# Patient Record
Sex: MALE | Race: WHITE | NOT HISPANIC OR LATINO | Employment: OTHER | URBAN - METROPOLITAN AREA
[De-identification: names, ages, dates, MRNs, and addresses within clinical notes are randomized per-mention and may not be internally consistent; named-entity substitution may affect disease eponyms.]

---

## 2018-10-03 ENCOUNTER — NEW PATIENT EMERGENCY (OUTPATIENT)
Dept: URBAN - METROPOLITAN AREA CLINIC 39 | Facility: CLINIC | Age: 58
End: 2018-10-03

## 2018-10-03 VITALS — HEART RATE: 63 BPM | DIASTOLIC BLOOD PRESSURE: 67 MMHG | SYSTOLIC BLOOD PRESSURE: 159 MMHG | HEIGHT: 60 IN

## 2018-10-03 DIAGNOSIS — H40.023: ICD-10-CM

## 2018-10-03 DIAGNOSIS — H25.813: ICD-10-CM

## 2018-10-03 DIAGNOSIS — H04.123: ICD-10-CM

## 2018-10-03 DIAGNOSIS — H40.043: ICD-10-CM

## 2018-10-03 PROCEDURE — 92020 GONIOSCOPY: CPT

## 2018-10-03 PROCEDURE — 92004 COMPRE OPH EXAM NEW PT 1/>: CPT

## 2018-10-03 PROCEDURE — 76514 ECHO EXAM OF EYE THICKNESS: CPT

## 2018-10-03 PROCEDURE — 92132 CPTRZD OPH DX IMG ANT SGM: CPT

## 2018-10-03 ASSESSMENT — VISUAL ACUITY
OS_SC: 20/25-2
OS_SC: J6
OD_SC: J10
OD_SC: 20/30-1

## 2018-10-03 ASSESSMENT — TONOMETRY
OD_IOP_MMHG: 27
OS_IOP_MMHG: 28

## 2018-10-03 ASSESSMENT — PACHYMETRY
OS_CT_UM: 598
OD_CT_UM: 628

## 2018-10-12 ENCOUNTER — IOP CHECK (OUTPATIENT)
Dept: URBAN - METROPOLITAN AREA CLINIC 36 | Facility: CLINIC | Age: 58
End: 2018-10-12

## 2018-10-12 DIAGNOSIS — H40.053: ICD-10-CM

## 2018-10-12 DIAGNOSIS — H40.043: ICD-10-CM

## 2018-10-12 DIAGNOSIS — H40.023: ICD-10-CM

## 2018-10-12 PROCEDURE — 92012 INTRM OPH EXAM EST PATIENT: CPT

## 2018-10-12 RX ORDER — BRIMONIDINE TARTRATE 1.5 MG/ML: 1 SOLUTION/ DROPS OPHTHALMIC TWICE A DAY

## 2018-10-12 ASSESSMENT — VISUAL ACUITY
OD_SC: 20/30-2
OS_SC: 20/25+2

## 2018-10-12 ASSESSMENT — TONOMETRY
OD_IOP_MMHG: 16
OS_IOP_MMHG: 18

## 2019-02-01 ENCOUNTER — IOP CHECK (OUTPATIENT)
Dept: URBAN - METROPOLITAN AREA CLINIC 36 | Facility: CLINIC | Age: 59
End: 2019-02-01

## 2019-02-01 DIAGNOSIS — H40.043: ICD-10-CM

## 2019-02-01 DIAGNOSIS — H40.053: ICD-10-CM

## 2019-02-01 DIAGNOSIS — H40.023: ICD-10-CM

## 2019-02-01 PROCEDURE — 92012 INTRM OPH EXAM EST PATIENT: CPT

## 2019-02-01 PROCEDURE — 92133 CPTRZD OPH DX IMG PST SGM ON: CPT

## 2019-02-01 RX ORDER — BRIMONIDINE TARTRATE 2 MG/MG: 1 SOLUTION/ DROPS OPHTHALMIC TWICE A DAY

## 2019-02-01 ASSESSMENT — TONOMETRY
OS_IOP_MMHG: 16
OD_IOP_MMHG: 16

## 2019-02-01 ASSESSMENT — VISUAL ACUITY
OS_SC: 20/25-1
OD_SC: 20/30-2

## 2019-07-26 ENCOUNTER — IOP CHECK (OUTPATIENT)
Dept: URBAN - METROPOLITAN AREA CLINIC 36 | Facility: CLINIC | Age: 59
End: 2019-07-26

## 2019-07-26 DIAGNOSIS — H40.043: ICD-10-CM

## 2019-07-26 DIAGNOSIS — H04.123: ICD-10-CM

## 2019-07-26 DIAGNOSIS — H40.023: ICD-10-CM

## 2019-07-26 DIAGNOSIS — H40.053: ICD-10-CM

## 2019-07-26 PROCEDURE — 92012 INTRM OPH EXAM EST PATIENT: CPT

## 2019-07-26 PROCEDURE — 92083 EXTENDED VISUAL FIELD XM: CPT

## 2019-07-26 ASSESSMENT — VISUAL ACUITY
OD_SC: 20/30+2
OS_SC: 20/30+2

## 2019-07-26 ASSESSMENT — TONOMETRY
OD_IOP_MMHG: 14
OS_IOP_MMHG: 15

## 2019-12-17 ENCOUNTER — ESTABLISHED COMPREHENSIVE EXAM (OUTPATIENT)
Dept: URBAN - METROPOLITAN AREA CLINIC 43 | Facility: CLINIC | Age: 59
End: 2019-12-17

## 2019-12-17 DIAGNOSIS — H40.023: ICD-10-CM

## 2019-12-17 DIAGNOSIS — H40.043: ICD-10-CM

## 2019-12-17 DIAGNOSIS — H40.053: ICD-10-CM

## 2019-12-17 PROCEDURE — 92014 COMPRE OPH EXAM EST PT 1/>: CPT

## 2019-12-17 PROCEDURE — 92250 FUNDUS PHOTOGRAPHY W/I&R: CPT

## 2019-12-17 PROCEDURE — 9222550 BILAT EXTENDED OPHTHALMOSCOPY, FIRST

## 2019-12-17 ASSESSMENT — VISUAL ACUITY
OD_SC: J6
OD_CC: 20/30+2
OD_CC: J1
OS_CC: 20/25-1
OS_SC: J12
OS_CC: J2
OS_SC: 20/40+1
OD_SC: 20/40

## 2019-12-17 ASSESSMENT — TONOMETRY
OD_IOP_MMHG: 14
OS_IOP_MMHG: 15

## 2020-05-29 ENCOUNTER — IOP CHECK (OUTPATIENT)
Dept: URBAN - METROPOLITAN AREA CLINIC 36 | Facility: CLINIC | Age: 60
End: 2020-05-29

## 2020-05-29 DIAGNOSIS — H40.043: ICD-10-CM

## 2020-05-29 DIAGNOSIS — H40.053: ICD-10-CM

## 2020-05-29 DIAGNOSIS — H40.1131: ICD-10-CM

## 2020-05-29 DIAGNOSIS — H04.123: ICD-10-CM

## 2020-05-29 PROCEDURE — 92020 GONIOSCOPY: CPT

## 2020-05-29 PROCEDURE — 92133 CPTRZD OPH DX IMG PST SGM ON: CPT

## 2020-05-29 PROCEDURE — 92014 COMPRE OPH EXAM EST PT 1/>: CPT

## 2020-05-29 ASSESSMENT — TONOMETRY
OD_IOP_MMHG: 11
OS_IOP_MMHG: 14

## 2020-05-29 ASSESSMENT — VISUAL ACUITY
OS_SC: 20/25-1
OD_SC: 20/40+1

## 2020-07-02 ENCOUNTER — CLINIC PROCEDURE ONLY (OUTPATIENT)
Dept: URBAN - METROPOLITAN AREA CLINIC 43 | Facility: CLINIC | Age: 60
End: 2020-07-02

## 2020-07-02 DIAGNOSIS — H40.1131: ICD-10-CM

## 2020-07-02 DIAGNOSIS — H40.043: ICD-10-CM

## 2020-07-02 DIAGNOSIS — H40.053: ICD-10-CM

## 2020-07-02 PROCEDURE — 99211T TECH SERVICE

## 2020-07-02 PROCEDURE — 6585550 LASER TRABECULOPLASTY

## 2020-07-02 RX ORDER — NEPAFENAC 3 MG/ML: 1 SUSPENSION/ DROPS OPHTHALMIC ONCE A DAY

## 2020-07-02 ASSESSMENT — TONOMETRY
OD_IOP_MMHG: 15
OS_IOP_MMHG: 17

## 2020-07-02 ASSESSMENT — VISUAL ACUITY
OS_SC: 20/30
OD_SC: 20/40-2

## 2020-08-14 ENCOUNTER — IOP CHECK (OUTPATIENT)
Dept: URBAN - METROPOLITAN AREA CLINIC 36 | Facility: CLINIC | Age: 60
End: 2020-08-14

## 2020-08-14 DIAGNOSIS — H40.043: ICD-10-CM

## 2020-08-14 DIAGNOSIS — H40.053: ICD-10-CM

## 2020-08-14 DIAGNOSIS — H40.1131: ICD-10-CM

## 2020-08-14 PROCEDURE — 92012 INTRM OPH EXAM EST PATIENT: CPT

## 2020-08-14 ASSESSMENT — TONOMETRY
OD_IOP_MMHG: 11
OS_IOP_MMHG: 17

## 2020-08-14 ASSESSMENT — VISUAL ACUITY
OD_CC: 20/40+1
OS_CC: 20/20-1

## 2021-01-26 NOTE — PATIENT DISCUSSION
Mild but present. Probably present in the past and never picked up. No APD and equal in light and dark. Possible due to migraines or chronic antihistamine use.

## 2021-07-13 NOTE — PATIENT DISCUSSION
Trialed -0.25 over new glasses and helped cross eyed feeling. New sent to optical for remake with less +.

## 2021-08-13 ENCOUNTER — ESTABLISHED COMPREHENSIVE EXAM (OUTPATIENT)
Dept: URBAN - METROPOLITAN AREA CLINIC 36 | Facility: CLINIC | Age: 61
End: 2021-08-13

## 2021-08-13 DIAGNOSIS — H40.043: ICD-10-CM

## 2021-08-13 DIAGNOSIS — H40.053: ICD-10-CM

## 2021-08-13 DIAGNOSIS — H40.1131: ICD-10-CM

## 2021-08-13 PROCEDURE — 92014 COMPRE OPH EXAM EST PT 1/>: CPT

## 2021-08-13 PROCEDURE — 92250 FUNDUS PHOTOGRAPHY W/I&R: CPT

## 2021-08-13 ASSESSMENT — VISUAL ACUITY
OS_SC: J8-
OS_SC: 20/20-2
OD_SC: 20/30-2
OD_SC: J10

## 2021-08-13 ASSESSMENT — TONOMETRY
OD_IOP_MMHG: 11
OS_IOP_MMHG: 13